# Patient Record
Sex: FEMALE | Race: WHITE | HISPANIC OR LATINO | Employment: STUDENT | URBAN - METROPOLITAN AREA
[De-identification: names, ages, dates, MRNs, and addresses within clinical notes are randomized per-mention and may not be internally consistent; named-entity substitution may affect disease eponyms.]

---

## 2020-02-02 ENCOUNTER — APPOINTMENT (EMERGENCY)
Dept: RADIOLOGY | Facility: HOSPITAL | Age: 25
End: 2020-02-02
Payer: MEDICARE

## 2020-02-02 ENCOUNTER — HOSPITAL ENCOUNTER (EMERGENCY)
Facility: HOSPITAL | Age: 25
Discharge: HOME | End: 2020-02-02
Attending: EMERGENCY MEDICINE | Admitting: EMERGENCY MEDICINE
Payer: MEDICARE

## 2020-02-02 ENCOUNTER — APPOINTMENT (EMERGENCY)
Dept: CT IMAGING | Facility: HOSPITAL | Age: 25
End: 2020-02-02
Payer: MEDICARE

## 2020-02-02 VITALS
DIASTOLIC BLOOD PRESSURE: 82 MMHG | SYSTOLIC BLOOD PRESSURE: 120 MMHG | HEART RATE: 98 BPM | TEMPERATURE: 98.6 F | RESPIRATION RATE: 22 BRPM | OXYGEN SATURATION: 98 %

## 2020-02-02 DIAGNOSIS — R07.9 CHEST PAIN: Primary | ICD-10-CM

## 2020-02-02 LAB
ANION GAP SERPL CALCULATED.3IONS-SCNC: 9 MMOL/L (ref 4–13)
ATRIAL RATE: 99 BPM
BACTERIA UR QL AUTO: ABNORMAL /HPF
BASOPHILS # BLD AUTO: 0.03 THOUSANDS/ΜL (ref 0–0.1)
BASOPHILS NFR BLD AUTO: 0 % (ref 0–1)
BILIRUB UR QL STRIP: NEGATIVE
BUN SERPL-MCNC: 10 MG/DL (ref 5–25)
CALCIUM SERPL-MCNC: 9 MG/DL (ref 8.3–10.1)
CHLORIDE SERPL-SCNC: 100 MMOL/L (ref 100–108)
CLARITY UR: CLEAR
CO2 SERPL-SCNC: 26 MMOL/L (ref 21–32)
COLOR UR: YELLOW
CREAT SERPL-MCNC: 1.02 MG/DL (ref 0.6–1.3)
D DIMER PPP FEU-MCNC: 0.86 UG/ML FEU
EOSINOPHIL # BLD AUTO: 0 THOUSAND/ΜL (ref 0–0.61)
EOSINOPHIL NFR BLD AUTO: 0 % (ref 0–6)
ERYTHROCYTE [DISTWIDTH] IN BLOOD BY AUTOMATED COUNT: 14.5 % (ref 11.6–15.1)
EXT PREG TEST URINE: NEGATIVE
EXT. CONTROL ED NAV: NORMAL
GFR SERPL CREATININE-BSD FRML MDRD: 78 ML/MIN/1.73SQ M
GLUCOSE SERPL-MCNC: 115 MG/DL (ref 65–140)
GLUCOSE UR STRIP-MCNC: NEGATIVE MG/DL
HCT VFR BLD AUTO: 38.5 % (ref 34.8–46.1)
HGB BLD-MCNC: 12.3 G/DL (ref 11.5–15.4)
HGB UR QL STRIP.AUTO: ABNORMAL
IMM GRANULOCYTES # BLD AUTO: 0.06 THOUSAND/UL (ref 0–0.2)
IMM GRANULOCYTES NFR BLD AUTO: 1 % (ref 0–2)
KETONES UR STRIP-MCNC: ABNORMAL MG/DL
LEUKOCYTE ESTERASE UR QL STRIP: NEGATIVE
LYMPHOCYTES # BLD AUTO: 1.37 THOUSANDS/ΜL (ref 0.6–4.47)
LYMPHOCYTES NFR BLD AUTO: 12 % (ref 14–44)
MCH RBC QN AUTO: 27.7 PG (ref 26.8–34.3)
MCHC RBC AUTO-ENTMCNC: 31.9 G/DL (ref 31.4–37.4)
MCV RBC AUTO: 87 FL (ref 82–98)
MONOCYTES # BLD AUTO: 0.82 THOUSAND/ΜL (ref 0.17–1.22)
MONOCYTES NFR BLD AUTO: 7 % (ref 4–12)
NEUTROPHILS # BLD AUTO: 9.11 THOUSANDS/ΜL (ref 1.85–7.62)
NEUTS SEG NFR BLD AUTO: 80 % (ref 43–75)
NITRITE UR QL STRIP: NEGATIVE
NON-SQ EPI CELLS URNS QL MICRO: ABNORMAL /HPF
NRBC BLD AUTO-RTO: 0 /100 WBCS
P AXIS: 64 DEGREES
PH UR STRIP.AUTO: 7 [PH]
PLATELET # BLD AUTO: 244 THOUSANDS/UL (ref 149–390)
PMV BLD AUTO: 10.3 FL (ref 8.9–12.7)
POTASSIUM SERPL-SCNC: 3.6 MMOL/L (ref 3.5–5.3)
PR INTERVAL: 138 MS
PROT UR STRIP-MCNC: NEGATIVE MG/DL
QRS AXIS: 71 DEGREES
QRSD INTERVAL: 80 MS
QT INTERVAL: 332 MS
QTC INTERVAL: 426 MS
RBC # BLD AUTO: 4.44 MILLION/UL (ref 3.81–5.12)
RBC #/AREA URNS AUTO: ABNORMAL /HPF
SODIUM SERPL-SCNC: 135 MMOL/L (ref 136–145)
SP GR UR STRIP.AUTO: 1.01 (ref 1–1.03)
T WAVE AXIS: 64 DEGREES
TROPONIN I SERPL-MCNC: <0.02 NG/ML
UROBILINOGEN UR QL STRIP.AUTO: 0.2 E.U./DL
VENTRICULAR RATE: 99 BPM
WBC # BLD AUTO: 11.39 THOUSAND/UL (ref 4.31–10.16)
WBC #/AREA URNS AUTO: ABNORMAL /HPF

## 2020-02-02 PROCEDURE — 99285 EMERGENCY DEPT VISIT HI MDM: CPT | Performed by: EMERGENCY MEDICINE

## 2020-02-02 PROCEDURE — 84484 ASSAY OF TROPONIN QUANT: CPT | Performed by: EMERGENCY MEDICINE

## 2020-02-02 PROCEDURE — 99284 EMERGENCY DEPT VISIT MOD MDM: CPT

## 2020-02-02 PROCEDURE — 71275 CT ANGIOGRAPHY CHEST: CPT

## 2020-02-02 PROCEDURE — 80048 BASIC METABOLIC PNL TOTAL CA: CPT | Performed by: EMERGENCY MEDICINE

## 2020-02-02 PROCEDURE — 96374 THER/PROPH/DIAG INJ IV PUSH: CPT

## 2020-02-02 PROCEDURE — 85025 COMPLETE CBC W/AUTO DIFF WBC: CPT | Performed by: EMERGENCY MEDICINE

## 2020-02-02 PROCEDURE — 85379 FIBRIN DEGRADATION QUANT: CPT | Performed by: EMERGENCY MEDICINE

## 2020-02-02 PROCEDURE — 81025 URINE PREGNANCY TEST: CPT | Performed by: EMERGENCY MEDICINE

## 2020-02-02 PROCEDURE — 93010 ELECTROCARDIOGRAM REPORT: CPT | Performed by: INTERNAL MEDICINE

## 2020-02-02 PROCEDURE — 81001 URINALYSIS AUTO W/SCOPE: CPT | Performed by: EMERGENCY MEDICINE

## 2020-02-02 PROCEDURE — 96361 HYDRATE IV INFUSION ADD-ON: CPT

## 2020-02-02 PROCEDURE — 36415 COLL VENOUS BLD VENIPUNCTURE: CPT | Performed by: EMERGENCY MEDICINE

## 2020-02-02 PROCEDURE — 71046 X-RAY EXAM CHEST 2 VIEWS: CPT

## 2020-02-02 PROCEDURE — 93005 ELECTROCARDIOGRAM TRACING: CPT

## 2020-02-02 RX ORDER — KETOROLAC TROMETHAMINE 30 MG/ML
30 INJECTION, SOLUTION INTRAMUSCULAR; INTRAVENOUS ONCE
Status: COMPLETED | OUTPATIENT
Start: 2020-02-02 | End: 2020-02-02

## 2020-02-02 RX ORDER — IBUPROFEN 600 MG/1
600 TABLET ORAL EVERY 6 HOURS PRN
Qty: 30 TABLET | Refills: 0 | Status: SHIPPED | OUTPATIENT
Start: 2020-02-02 | End: 2020-02-12

## 2020-02-02 RX ADMIN — KETOROLAC TROMETHAMINE 30 MG: 30 INJECTION, SOLUTION INTRAMUSCULAR at 05:50

## 2020-02-02 RX ADMIN — IOHEXOL 85 ML: 350 INJECTION, SOLUTION INTRAVENOUS at 04:52

## 2020-02-02 RX ADMIN — SODIUM CHLORIDE 1000 ML: 0.9 INJECTION, SOLUTION INTRAVENOUS at 04:16

## 2020-02-02 NOTE — ED PROVIDER NOTES
History  Chief Complaint   Patient presents with    Arm Pain     Per Pt " I am having a lot pain of L arm adn shoulder for the last two hours " PT stated she was skiing yesterday and fell on L arm  Hx of anxiety     [age] of female with history of depression and anxiety and is on birth control and spironolactone for acne is coming in with complaint some chest pain that she describes as sharp that gradually on sided when she was lying down in got more comfortable and she felt short of breath and had some tingling in her left arm and in her hands that she could not sleep  She was skiing today and did have a slight fall that she landed more onto her left side  She was wearing helmet did not specifically hit her head or pass out  She denies headache or neck pain  She has no abdominal pain  She has no leg pain or swelling  Patient isn't sure if it is just some chest discomfort from the fall with her anxiety but came in to get checked out  History provided by:  Patient  Arm Pain   Location:  Left arm  Quality:  Aching  Severity:  Mild  Associated symptoms: chest pain    Chest pain:     Quality: sharp      Severity:  Moderate    Onset quality:  Gradual    Timing:  Constant    Progression:  Unchanged    Chronicity:  New      None       Past Medical History:   Diagnosis Date    Anxiety     Migraine        History reviewed  No pertinent surgical history  History reviewed  No pertinent family history  I have reviewed and agree with the history as documented  Social History     Tobacco Use    Smoking status: Never Smoker    Smokeless tobacco: Never Used   Substance Use Topics    Alcohol use: Yes     Comment: occ    Drug use: Yes     Types: Marijuana        Review of Systems   Cardiovascular: Positive for chest pain  All other systems reviewed and are negative  Physical Exam  Physical Exam   Constitutional: She is oriented to person, place, and time   She appears well-developed and well-nourished  No distress  HENT:   Head: Normocephalic and atraumatic  Nose: Nose normal    Mouth/Throat: Oropharynx is clear and moist    Eyes: Conjunctivae and EOM are normal    Neck: Normal range of motion  Neck supple  Cardiovascular: Normal rate, regular rhythm and normal heart sounds  Pulmonary/Chest: Effort normal and breath sounds normal  Tachypnea noted  No respiratory distress  She exhibits tenderness (mild left side tenderness)  Abdominal: Soft  She exhibits no distension  There is no tenderness  Musculoskeletal: Normal range of motion  She exhibits no edema or deformity  No obvious bruising/trauma to chest or torso   Neurological: She is alert and oriented to person, place, and time  Skin: Skin is warm and dry  She is not diaphoretic  Psychiatric: Her mood appears anxious  Nursing note and vitals reviewed        Vital Signs  ED Triage Vitals [02/02/20 0250]   Temperature Pulse Respirations Blood Pressure SpO2   98 6 °F (37 °C) (!) 110 20 128/73 98 %      Temp Source Heart Rate Source Patient Position - Orthostatic VS BP Location FiO2 (%)   Oral Monitor Sitting Right arm --      Pain Score       7           Vitals:    02/02/20 0250 02/02/20 0330 02/02/20 0535   BP: 128/73 121/70 120/82   Pulse: (!) 110 100 98   Patient Position - Orthostatic VS: Sitting           Visual Acuity  Visual Acuity      Most Recent Value   L Pupil Size (mm)  3   R Pupil Size (mm)  3          ED Medications  Medications   ketorolac (TORADOL) injection 30 mg (has no administration in time range)   sodium chloride 0 9 % bolus 1,000 mL (0 mL Intravenous Stopped 2/2/20 0547)   iohexol (OMNIPAQUE) 350 MG/ML injection (MULTI-DOSE) 85 mL (85 mL Intravenous Given 2/2/20 2162)       Diagnostic Studies  Results Reviewed     Procedure Component Value Units Date/Time    Urine Microscopic [[id number]]  (Abnormal) Collected:  02/02/20 0417    Lab Status:  Final result Specimen:  Urine, Other Updated:  02/02/20 0437     RBC, UA 1-2 /hpf      WBC, UA None Seen /hpf      Epithelial Cells Occasional /hpf      Bacteria, UA None Seen /hpf     UA (URINE) with reflex to Scope [[id number]]  (Abnormal) Collected:  02/02/20 0417    Lab Status:  Final result Specimen:  Urine, Other Updated:  02/02/20 0422     Color, UA Yellow     Clarity, UA Clear     Specific Gravity, UA 1 010     pH, UA 7 0     Leukocytes, UA Negative     Nitrite, UA Negative     Protein, UA Negative mg/dl      Glucose, UA Negative mg/dl      Ketones, UA Trace mg/dl      Urobilinogen, UA 0 2 E U /dl      Bilirubin, UA Negative     Blood, UA Trace-Intact    POCT pregnancy, urine [[id number]]  (Normal) Resulted:  02/02/20 0421    Lab Status:  Final result Updated:  02/02/20 0421     EXT PREG TEST UR (Ref: Negative) negative     Control valid    Troponin I [[id number]]  (Normal) Collected:  02/02/20 0318    Lab Status:  Final result Specimen:  Blood from Arm, Left Updated:  02/02/20 0342     Troponin I <0 02 ng/mL     D-Dimer [[id number]]  (Abnormal) Collected:  02/02/20 0317    Lab Status:  Final result Specimen:  Blood from Arm, Left Updated:  02/02/20 0337     D-Dimer, Quant 0 86 ug/ml FEU     Basic metabolic panel [[id number]]  (Abnormal) Collected:  02/02/20 0317    Lab Status:  Final result Specimen:  Blood from Arm, Left Updated:  02/02/20 0333     Sodium 135 mmol/L      Potassium 3 6 mmol/L      Chloride 100 mmol/L      CO2 26 mmol/L      ANION GAP 9 mmol/L      BUN 10 mg/dL      Creatinine 1 02 mg/dL      Glucose 115 mg/dL      Calcium 9 0 mg/dL      eGFR 78 ml/min/1 73sq m     Narrative:       [facility] guidelines for Chronic Kidney Disease (CKD):     Stage 1 with normal or high GFR (GFR > 90 mL/min/1 73 square meters)    Stage 2 Mild CKD (GFR = 60-89 mL/min/1 73 square meters)    Stage 3A Moderate CKD (GFR = 45-59 mL/min/1 73 square meters)    Stage 3B Moderate CKD (GFR = 30-44 mL/min/1 73 square meters)    Stage 4 Severe CKD (GFR = 15-29 mL/min/1 73 square meters)    Stage 5 End Stage CKD (GFR <15 mL/min/1 73 square meters)  Note: GFR calculation is accurate only with a steady state creatinine    CBC and differential [[id number]]  (Abnormal) Collected:  02/02/20 0318    Lab Status:  Final result Specimen:  Blood from Arm, Left Updated:  02/02/20 0323     WBC 11 39 Thousand/uL      RBC 4 44 Million/uL      Hemoglobin 12 3 g/dL      Hematocrit 38 5 %      MCV 87 fL      MCH 27 7 pg      MCHC 31 9 g/dL      RDW 14 5 %      MPV 10 3 fL      Platelets 541 Thousands/uL      nRBC 0 /100 WBCs      Neutrophils Relative 80 %      Immat GRANS % 1 %      Lymphocytes Relative 12 %      Monocytes Relative 7 %      Eosinophils Relative 0 %      Basophils Relative 0 %      Neutrophils Absolute 9 11 Thousands/µL      Immature Grans Absolute 0 06 Thousand/uL      Lymphocytes Absolute 1 37 Thousands/µL      Monocytes Absolute 0 82 Thousand/µL      Eosinophils Absolute 0 00 Thousand/µL      Basophils Absolute 0 03 Thousands/µL                  CTA ED chest PE study   Final Result by Eligio Reyna MD (02/02 0519)      5 mm and 2 mm right upper lobe nodules (2:84, 89)  Based on current Fleischner Society 2017 Guidelines on incidental pulmonary nodule, no routine follow-up is needed if the patient is considered low risk for lung cancer  If the patient is considered    high risk for lung cancer, 12 month follow-up non-contrast chest CT is recommended  No pulmonary embolism or aortic dissection  No effusion, airspace disease, or pneumothorax  Left hilar adenopathy of unknown origin  Workstation performed: [id number]         XR chest 2 views   Final Result by Eligio Reyna MD (02/02 1967)      No acute cardiopulmonary disease  Findings concur with the preliminary report by the referring clinician already in PACS and/or our electronic record EPIC        Workstation performed: [id number]                    Procedures  ECG 12 Lead Documentation Only  Date/Time: 2/2/2020 4:16 AM  Performed by: Roseline Sevilla MD  Authorized by: Roseline Sevilla MD     Indications / Diagnosis:  Chest pain  ECG reviewed by me, the ED Provider: yes    Patient location:  ED  Rate:     ECG rate:  99    ECG rate assessment: normal    Rhythm:     Rhythm: sinus rhythm    QRS:     QRS axis:  Normal  ST segments:     ST segments:  Normal  T waves:     T waves: normal               ED Course         HEART Risk Score      Most Recent Value   History  0 Filed at: 02/02/2020 0538   ECG  0 Filed at: 02/02/2020 0538   Age  0 Filed at: 02/02/2020 0538   Risk Factors  0 Filed at: 02/02/2020 0538   Troponin  0 Filed at: 02/02/2020 0538   Heart Score Risk Calculator   History  0 Filed at: 02/02/2020 0538   ECG  0 Filed at: 02/02/2020 0538   Age  0 Filed at: 02/02/2020 0538   Risk Factors  0 Filed at: 02/02/2020 0538   Troponin  0 Filed at: 02/02/2020 0538   HEART Score  0 Filed at: 02/02/2020 8188   HEART Score  0 Filed at: 02/02/2020 0538            PERC Rule for PE      Most Recent Value   PERC Rule for PE   Age >=50  0 Filed at: 02/02/2020 0539   HR >=100  1 Filed at: 02/02/2020 0539   O2 Sat on room air < 95%  0 Filed at: 02/02/2020 3323   History of PE or DVT  0 Filed at: 02/02/2020 8017   Recent trauma or surgery  0 Filed at: 02/02/2020 0539   Hemoptysis  0 Filed at: 02/02/2020 5933   Exogenous estrogen  1 Filed at: 02/02/2020 0539   Unilateral leg swelling  0 Filed at: 02/02/2020 0539   PERC Rule for PE Results  2 Filed at: 02/02/2020 8164                      MDM  Number of Diagnoses or Management Options  Chest pain: new and requires workup     Amount and/or Complexity of Data Reviewed  Clinical lab tests: ordered and reviewed  Tests in the radiology section of CPT®: ordered and reviewed  Independent visualization of images, tracings, or specimens: yes    Risk of Complications, Morbidity, and/or Mortality  Presenting problems: high          Disposition  Final diagnoses:   Chest pain     Time reflects when diagnosis was documented in both MDM as applicable and the Disposition within this note     Time User Action Codes Description Comment    2/2/2020  5:38 AM Christiano Mercado Add [R07 9] Chest pain       ED Disposition     ED Disposition Condition Date/Time Comment    Discharge Stable Sun Feb 2, 2020  5:38 AM Ephriam Handler discharge to home/self care  Follow-up Information     Follow up With Specialties Details Why Contact Info Additional [address] [facility] Department Emergency Medicine Go to  If symptoms worsen [address] [facility] [address] [phone] [place] [facility], [address] [place] [address], [id number]          Patient's Medications   Discharge Prescriptions    IBUPROFEN (MOTRIN) 600 MG TABLET    Take 1 tablet (600 mg total) by mouth every 6 (six) hours as needed for mild pain for up to 10 days       Start Date: 2/2/2020  End Date: 2/12/2020       Order Dose: 600 mg       Quantity: 30 tablet    Refills: 0     No discharge procedures on file      ED Provider  Electronically Signed by           Ru Maxwell MD  02/02/20 6661

## 2020-02-02 NOTE — ED NOTES
Patient transported to [facility], [address] [place] [address]  02/02/20 2863

## 2020-02-02 NOTE — ED NOTES
Discharge instructions and medications reviewed  Pt with no questions or concerns at this time  Pt ambulatory off unit with steady gait        Avril Chacko RN  02/02/20 9265